# Patient Record
Sex: FEMALE | Race: WHITE | NOT HISPANIC OR LATINO | Employment: UNEMPLOYED | URBAN - METROPOLITAN AREA
[De-identification: names, ages, dates, MRNs, and addresses within clinical notes are randomized per-mention and may not be internally consistent; named-entity substitution may affect disease eponyms.]

---

## 2018-01-03 ENCOUNTER — OFFICE VISIT (OUTPATIENT)
Dept: URGENT CARE | Facility: RETAIL CLINIC | Age: 53
End: 2018-01-03
Payer: COMMERCIAL

## 2018-01-03 VITALS — TEMPERATURE: 97.8 F | DIASTOLIC BLOOD PRESSURE: 83 MMHG | SYSTOLIC BLOOD PRESSURE: 134 MMHG | HEART RATE: 69 BPM

## 2018-01-03 DIAGNOSIS — R09.81 NASAL SINUS CONGESTION: ICD-10-CM

## 2018-01-03 DIAGNOSIS — J01.90 ACUTE SINUSITIS WITH SYMPTOMS > 10 DAYS: Primary | ICD-10-CM

## 2018-01-03 PROCEDURE — 99213 OFFICE O/P EST LOW 20 MIN: CPT | Performed by: PHYSICIAN ASSISTANT

## 2018-01-03 NOTE — MR AVS SNAPSHOT
"              After Visit Summary   1/3/2018    Iza Aviles    MRN: [id number]           Patient Information     Date Of Birth          1965        Visit Information        Provider Department      1/3/2018 3:30 PM Tash Pa PA-C [facility]        Today's Diagnoses     Nasal sinus congestion    -  1    Acute sinusitis with symptoms > 10 days          Care Instructions    Mucinex in am.    Saline nose spray.      Please FOLLOW UP at primary care clinic if not improving, new symptoms, worse or this does not resolve.            Follow-ups after your visit        Who to contact     You can reach your care team any time of the day by calling [phone].  Notification of test results:  If you have an abnormal lab result, we will notify you by phone as soon as possible.         Additional Information About Your Visit        MyChart Information     Spart[facility] [employer] lets you send messages to your doctor, view your test results, renew your prescriptions, schedule appointments and more. To sign up, go to [email or web address] . Click on \"Log in\" on the left side of the screen, which will take you to the Welcome page. Then click on \"Sign up Now\" on the right side of the page.     You will be asked to enter the access code listed below, as well as some personal information. Please follow the directions to create your username and password.     Your access code is: [email or web address]  Expires: 4/3/2018  4:07 PM     Your access code will  in 90 days. If you need help or a new code, please call your [place] clinic or [phone].        Care EveryWhere ID     This is your Care EveryWhere ID. This could be used by other organizations to access your [place] medical records  [id number]        Your Vitals Were     Pulse Temperature                69 97.8  F (36.6  C) (Oral)           Blood Pressure from Last 3 Encounters:   18 134/83   16 135/84   06/11/15 116/70    Weight from Last 3 " Encounters:   06/03/13 155 lb (70.3 kg)   05/13/04 160 lb (72.6 kg)              Today, you had the following     No orders found for display         Today's Medication Changes          These changes are accurate as of: 1/3/18  4:07 PM.  If you have any questions, ask your nurse or doctor.               Start taking these medicines.        Dose/Directions    amoxicillin-clavulanate 875-125 MG per tablet   Commonly known as:  AUGMENTIN   Used for:  Acute sinusitis with symptoms > 10 days   Started by:  Tash Pa PA-C        Dose:  1 tablet   Take 1 tablet by mouth 2 times daily   Quantity:  28 tablet   Refills:  0            Where to get your medicines      These medications were sent to [address]  [address] [facility] [address]     Phone:  [phone]     amoxicillin-clavulanate 875-125 MG per tablet                Primary Care Provider Office Phone # Fax #    [facility] [phone]       [address] [facility] [address]        Equal Access to Services     [facility]: Hadii aad ku hadasho Soomaali, waaxda luqadaha, qaybta kaalmada adeegyada, waxay idiin hayantonio hernandez . So [facility] [phone].    ATENCIÓN: Si habla español, tiene a dotson disposición servicios gratuitos de asistencia lingüística. Llame al [phone].    We comply with applicable federal civil rights laws and [place] laws. We do not discriminate on the basis of race, color, national origin, age, disability, sex, sexual orientation, or gender identity.            Thank you!     Thank you for choosing [facility]  for your care. Our goal is always to provide you with excellent care. Hearing back from our patients is one way we can continue to improve our services. Please take a few minutes to complete the written survey that you may receive in the mail after your visit with us. Thank you!             Your Updated Medication List -  Protect others around you: Learn how to safely use, store and throw away your medicines at www.disposemymeds.org.          This list is accurate as of: 1/3/18  4:07 PM.  Always use your most recent med list.                   Brand Name Dispense Instructions for use Diagnosis    amoxicillin-clavulanate 875-125 MG per tablet    AUGMENTIN    28 tablet    Take 1 tablet by mouth 2 times daily    Acute sinusitis with symptoms > 10 days       BENADRYL PO           IBUPROFEN PO      Take 600 mg by mouth once.        SINUS & ALLERGY PO

## 2018-01-03 NOTE — PROGRESS NOTES
Chief Complaint   Patient presents with     Sinus Problem     for about 3-4 days         SUBJECTIVE:   Pt. presenting to [facility] -  with a chief complaint of nasal and sinus congestion with > HA and pain and 'teeth hurt'. Had a head cold x weeks.   See CC..  Onset of symptoms gradual  Course of illness is worsening.    Severity moderate  Current and Associated symptoms: runny nose, stuffy nose, facial pain/pressure, headache and fatigue  Treatment measures tried include Tylenol/Ibuprofen and OTC Cough med.  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  Last antibiotic 12/2016 Augmentin for sinusitis   Smoker no    ROS:  Afebrile   Energy level is <  ENT - denies ear pain, throat pain.    CP - no cough,SOB or chest pain   GI- - appetite normal. No nausea, vomiting or diarrhea.   No bowel or bladder changes   MSK - no joint pain or swelling   Skin: No rashes  No past medical history on file.  No past surgical history on file.  There is no problem list on file for this patient.    Current Outpatient Prescriptions   Medication     DiphenhydrAMINE HCl (BENADRYL PO)     Chlorpheniramine-Phenylephrine (SINUS & ALLERGY PO)     IBUPROFEN PO     No current facility-administered medications for this visit.        OBJECTIVE:  /83  Pulse 69  Temp 97.8  F (36.6  C) (Oral)    GENERAL APPEARANCE: cooperative, alert and no distress. Appears well hydrated.  EYES: conjunctiva clear  HENT: Rt ear canal  clear and TM normal   Lt ear canal clear and TM normal   Nose mod congestion. thick discharge  Mouth without ulcers or lesions. no erythema. no exudate.  NECK: supple, few small shoddy NT ant nodes. No  posterior nodes.  RESP: lungs clear to auscultation - no rales, rhonchi or wheezes. Breathing easily.  CV: regular rates and rhythm  ABDOMEN:  soft, nontender, no HSM or masses and bowel sounds normal   SKIN: no suspicious lesions or rashes  some tenderness to palpate over martha max and left frontal  sinus areas.    ASSESSMENT:     Nasal sinus congestion  Acute sinusitis with symptoms > 10 days      PLAN:  Symptomatic measures Prescriptions as below. Discussed indications, dosing, side affects and adverse reactions of medications with  Patient- Augmentin  Eat yogurt daily or take a probiotic supplement when on antibiotics.  OTC cough suppressant/expectorant discussed  Salt water gargles - throat lozenges or honey/lemon tea if soothing and has a ST  saline nasal spray for  nasal congestion   Cool mist vaporizer.     Stay in clean air environment.  > rest.  > fluids.  Contagiousness and hygiene discussed.  Fever and pain  control measures discussed.   If unable to swallow or any breathing difficulty to go to ED AVS given and discussed:  Patient Instructions   Mucinex in am.    Saline nose spray.      Please FOLLOW UP at primary care clinic if not improving, new symptoms, worse or this does not resolve.    Pt is comfortable with this plan.  Electronically signed,  ABELARDO Pa PAC

## 2018-01-03 NOTE — PATIENT INSTRUCTIONS
Mucinex in am.    Saline nose spray.      Please FOLLOW UP at primary care clinic if not improving, new symptoms, worse or this does not resolve.

## 2019-11-27 ENCOUNTER — OFFICE VISIT (OUTPATIENT)
Dept: URGENT CARE | Facility: RETAIL CLINIC | Age: 54
End: 2019-11-27
Payer: COMMERCIAL

## 2019-11-27 VITALS
SYSTOLIC BLOOD PRESSURE: 130 MMHG | DIASTOLIC BLOOD PRESSURE: 84 MMHG | HEART RATE: 85 BPM | OXYGEN SATURATION: 97 % | TEMPERATURE: 98.3 F

## 2019-11-27 DIAGNOSIS — J20.9 ACUTE BRONCHITIS WITH SYMPTOMS > 10 DAYS: Primary | ICD-10-CM

## 2019-11-27 PROCEDURE — 99213 OFFICE O/P EST LOW 20 MIN: CPT | Performed by: INTERNAL MEDICINE

## 2019-11-27 RX ORDER — AZITHROMYCIN 250 MG/1
TABLET, FILM COATED ORAL
Qty: 6 TABLET | Refills: 0 | Status: SHIPPED | OUTPATIENT
Start: 2019-11-27

## 2019-11-27 NOTE — PROGRESS NOTES
[facility] Care Progress Note        Austyn Mustafa MD, MPH  11/27/2019        History:      Iza Aviles is a pleasant 54 year old year old female with a chief complaint of nasal congestion and cough w some whitish sputum for 5 weeks.No recent travel,no trauma and no recent surgery.No calf pain or swelling.  No fever or chills.   No dyspnea or wheezing. No chest pain.  No smoking history.   No headache or neck pain.  No GI or  symptoms.   No MSK symptoms.         Assessment and Plan:          Acute bronchitis with symptoms > 10 days  - azithromycin (ZITHROMAX) 250 MG tablet; Two tablets first day, then one tablet daily for four days.  Dispense: 6 tablet; Refill: 0  Discussed supportive care with the patient  Advised to increase fluid intake and rest.  F/u w PCP in 4-5 days, earlier if symptoms worsen.                   Physical Exam:      /84   Pulse 85   Temp 98.3  F (36.8  C) (Oral)   SpO2 97%      Constitutional: Patient is in no distress The patient is pleasant and cooperative.   HEENT: Head:  Head is atraumatic, normocephalic.    Eyes: Pupils are equal, round and reactive to light and accomodation.  Sclera is non-icteric. No conjunctival injection, or exudate noted. Extraocular motion is intact. Visual acuity is intact bilaterally.  Ears:  External acoustic canals are patent and clear.  There is no erythema and bulging( exudate)  of the ( R/L ) tympanic membrane(s ).   Nose:  Nasal congestion w/o drainage or mucosal ulceration is noted.  Throat: Oral mucosa is moist. No oral lesions are noted. Posterior pharynx is clear.     Neck Supple. There is no cervical lymphadenopathy.  No nuchal rigidity noted.  There is no meningismus.     Cardiovascular: Heart is regular to rate and rhythm.  No murmur is noted.     Lungs: Clear in the anterior and posterior pulmonary fields.   Abdomen: Soft and non-tender.    Back No flank tenderness is noted.   Extremeties No edema, no calf tenderness.    Neuro: No focal deficit.   Skin No petechiae or purpura is noted.  There is no rash.   Mood Normal              Data:      All new lab and imaging data was reviewed.   No results found for any visits on 11/27/19.

## 2022-11-04 ENCOUNTER — NURSE TRIAGE (OUTPATIENT)
Dept: NURSING | Facility: CLINIC | Age: 57
End: 2022-11-04

## 2022-11-04 NOTE — TELEPHONE ENCOUNTER
"Pt reports \"today at work got a paper cut on eyeball, a little bit blurred vision\". Pt reports if she closes her uninjured eye the injured eye is \"super blurry\". See full assessment below.    Advised pt to have another adult drive her to the ER now per protocol.    Pt verbalizes understanding and agrees to plan.     Reason for Disposition    Vision is blurred or lost in either eye    Additional Information    Negative: Knocked out (unconscious) > 1 minute    Negative: Sounds like a life-threatening emergency to the triager    Negative: Wound looks infected    Negative: Foreign body in the eye    Negative: Head injury is main concern    Negative: Neck injury is main concern    Answer Assessment - Initial Assessment Questions  1. MECHANISM: \"How did the injury happen?\"       Paper cut L eye \"me with a piece of paper\"  2. ONSET: \"When did the injury happen?\" (Minutes or hours ago)      8:30 am   3. LOCATION: \"What part of the eye is injured?\" (cornea, sclera, eyelid, or periorbital tissue)      \"felt like eyeball right in the center\"   4. APPEARANCE: \"What does the eye look like?\"      \"three other people saw a line, me personally can't really tell just looking at it\"  5. VISION: \"Is the vision blurred?\"       \"if I close one eye it's super blurry, if I close the eye that's good\"  6. PAIN: \"Is it painful?\" If Yes, ask: \"How bad is the pain?\"   (e.g., Scale 1-10; or mild, moderate, severe)     \"like hit in the eye with something, couldn't open for ten minutes, now not really painful\"  7. SIZE: For cuts, bruises, or swelling, ask: \"How large is it?\" (e.g., inches or centimeters)       Pt unable to tell   8. TETANUS: For any breaks in the skin, ask: \"When was the last tetanus booster?\"      See chart   9. CONTACTS: \"Do you wear contacts?\"      Pt denies   10. OTHER SYMPTOMS: \"Do you have any other symptoms?\" (e.g., headache, neck pain, vomiting)        Pt denies  11. PREGNANCY: \"Is there any chance you are pregnant?\" " "\"When was your last menstrual period?\"        n/a    Protocols used: EYE INJURY-A-[place]      "

## 2023-12-16 ENCOUNTER — NURSE TRIAGE (OUTPATIENT)
Dept: NURSING | Facility: CLINIC | Age: 58
End: 2023-12-16
Payer: COMMERCIAL

## 2023-12-16 ENCOUNTER — APPOINTMENT (OUTPATIENT)
Dept: CT IMAGING | Facility: CLINIC | Age: 58
End: 2023-12-16
Attending: EMERGENCY MEDICINE
Payer: COMMERCIAL

## 2023-12-16 ENCOUNTER — HOSPITAL ENCOUNTER (EMERGENCY)
Facility: CLINIC | Age: 58
Discharge: HOME | End: 2023-12-16
Attending: EMERGENCY MEDICINE | Admitting: EMERGENCY MEDICINE
Payer: COMMERCIAL

## 2023-12-16 ENCOUNTER — APPOINTMENT (OUTPATIENT)
Dept: GENERAL RADIOLOGY | Facility: CLINIC | Age: 58
End: 2023-12-16
Attending: EMERGENCY MEDICINE
Payer: COMMERCIAL

## 2023-12-16 VITALS
TEMPERATURE: 98.6 F | HEART RATE: 91 BPM | SYSTOLIC BLOOD PRESSURE: 144 MMHG | RESPIRATION RATE: 16 BRPM | OXYGEN SATURATION: 97 % | DIASTOLIC BLOOD PRESSURE: 87 MMHG | HEIGHT: 64 IN

## 2023-12-16 DIAGNOSIS — S02.5XXA CLOSED FRACTURE OF TOOTH, INITIAL ENCOUNTER: ICD-10-CM

## 2023-12-16 PROCEDURE — 99284 EMERGENCY DEPT VISIT MOD MDM: CPT | Mod: 25 | Performed by: EMERGENCY MEDICINE

## 2023-12-16 PROCEDURE — 250N000013 HC RX MED GY IP 250 OP 250 PS 637: Performed by: EMERGENCY MEDICINE

## 2023-12-16 PROCEDURE — 70486 CT MAXILLOFACIAL W/O DYE: CPT

## 2023-12-16 PROCEDURE — 72125 CT NECK SPINE W/O DYE: CPT

## 2023-12-16 PROCEDURE — 99284 EMERGENCY DEPT VISIT MOD MDM: CPT | Performed by: EMERGENCY MEDICINE

## 2023-12-16 PROCEDURE — 71046 X-RAY EXAM CHEST 2 VIEWS: CPT

## 2023-12-16 PROCEDURE — 71046 X-RAY EXAM CHEST 2 VIEWS: CPT | Mod: 26 | Performed by: RADIOLOGY

## 2023-12-16 PROCEDURE — 72125 CT NECK SPINE W/O DYE: CPT | Mod: 26 | Performed by: RADIOLOGY

## 2023-12-16 PROCEDURE — 70450 CT HEAD/BRAIN W/O DYE: CPT

## 2023-12-16 PROCEDURE — 70450 CT HEAD/BRAIN W/O DYE: CPT | Mod: 26 | Performed by: RADIOLOGY

## 2023-12-16 PROCEDURE — 70486 CT MAXILLOFACIAL W/O DYE: CPT | Mod: 26 | Performed by: RADIOLOGY

## 2023-12-16 RX ORDER — ACETAMINOPHEN 500 MG
1000 TABLET ORAL 3 TIMES DAILY
Qty: 42 TABLET | Refills: 0 | Status: SHIPPED | OUTPATIENT
Start: 2023-12-16 | End: 2023-12-23

## 2023-12-16 RX ORDER — ACETAMINOPHEN 500 MG
1000 TABLET ORAL ONCE
Status: COMPLETED | OUTPATIENT
Start: 2023-12-16 | End: 2023-12-16

## 2023-12-16 RX ORDER — IBUPROFEN 200 MG
400 TABLET ORAL ONCE
Status: COMPLETED | OUTPATIENT
Start: 2023-12-16 | End: 2023-12-16

## 2023-12-16 RX ORDER — IBUPROFEN 200 MG
400 TABLET ORAL 3 TIMES DAILY
Qty: 42 TABLET | Refills: 0 | Status: SHIPPED | OUTPATIENT
Start: 2023-12-16

## 2023-12-16 RX ADMIN — IBUPROFEN 400 MG: 200 TABLET, FILM COATED ORAL at 15:38

## 2023-12-16 RX ADMIN — ACETAMINOPHEN 1000 MG: 500 TABLET ORAL at 15:39

## 2023-12-16 ASSESSMENT — ACTIVITIES OF DAILY LIVING (ADL)
ADLS_ACUITY_SCORE: 33
ADLS_ACUITY_SCORE: 35

## 2023-12-16 NOTE — TELEPHONE ENCOUNTER
Nurse Triage SBAR    Is this a 2nd Level Triage? No    Situation/Background: Patient is calling with concern of being pushed by a horse in a gate area. The patient was pushed against the gate, a tooth on upper jaw is out of place. The gum is rough and damaged. Patient washed blood off her face due to tooth and nose being scraped.   Patient states the breath was knocked out of her but she did not lose consciousness.    Assessment:   Denies pain in upper jaw  Tooth on upper right jaw is still in jaw but appears to be knocked lose.     Recommendation: Per disposition, Go to ED now. Reviewed Care Advice with patient and verbalizes understanding. Patient was also given the number of emergency dental resident on call: [phone]. Declines additional questions. Advised patient to call back with any new or worsening symptoms. Patient verbalized understanding and agrees with plan. Assisted with connecting with scheduling for an appointment. If no visits available, can be seen in /[facility].     Protocol Recommended Disposition: Emergency department    Kaye Edwards RN on 12/16/2023 at 11:30 AM  [facility]  Reason for Disposition   Tooth is almost falling out    Additional Information   Negative: Knocked out (unconscious) > 1 minute   Negative: Difficult to awaken or acting confused (e.g., disoriented, slurred speech)   Negative: Severe neck pain   Negative: Sounds like a life-threatening emergency to the triager   Negative: Head injury is main concern   Negative: Neck injury is main concern   Negative: Wound looks infected   Negative: Tooth knocked out    Protocols used: Tooth Injury-A-

## 2023-12-16 NOTE — ED TRIAGE NOTES
Triage Assessment (Adult)       Row Name 12/16/23 1410          Triage Assessment    Airway WDL WDL        Respiratory WDL    Respiratory WDL WDL        Skin Circulation/Temperature WDL    Skin Circulation/Temperature WDL WDL        Cardiac WDL    Cardiac WDL WDL        Peripheral/Neurovascular WDL    Peripheral Neurovascular WDL WDL        Cognitive/Neuro/Behavioral WDL    Cognitive/Neuro/Behavioral WDL WDL

## 2023-12-16 NOTE — ED PROVIDER NOTES
"    [place] EMERGENCY DEPARTMENT ([facility])    12/16/23       ED PROVIDER NOTE       History     Chief Complaint   Patient presents with    Dental Injury     HPI  Iza Aviles is a 58 year old female who presents to the emergency department with complaints of dental pain after being pushed against a gate from horses with a 1 on the right front teeth out of place.  They referred by dentist to come to the ED and be seen by dental.  Patient reports while she was pinned by the horse she felt the wind was knocked out of her and she has some residual right-sided chest discomfort and right shoulder discomfort.  She denies any head trauma no loss of consciousness.  She denies any other complaints.    Past Medical History  History reviewed. No pertinent past medical history.  History reviewed. No pertinent surgical history.  azithromycin (ZITHROMAX) 250 MG tablet  Chlorpheniramine-Phenylephrine (SINUS & ALLERGY PO)  DiphenhydrAMINE HCl (BENADRYL PO)  IBUPROFEN PO      Allergies   Allergen Reactions    No Known Allergies      Family History  History reviewed. No pertinent family history.  Social History   Social History     Tobacco Use    Smoking status: Never    Smokeless tobacco: Never         A complete review of systems was performed with pertinent positives and negatives noted in the HPI, and all other systems negative.    Physical Exam   BP: (!) 144/87  Pulse: 91  Temp: 98.6  F (37  C)  Resp: 16  Height: 162.6 cm (5' 4\")  SpO2: 97 %  Physical Exam  Vitals and nursing note reviewed.   Constitutional:       General: She is not in acute distress.     Appearance: Normal appearance. She is well-developed.   HENT:      Head: Normocephalic and atraumatic.   Eyes:      General: No scleral icterus.     Conjunctiva/sclera: Conjunctivae normal.   Cardiovascular:      Rate and Rhythm: Normal rate.   Pulmonary:      Effort: Pulmonary effort is normal. No respiratory distress.   Abdominal:      General: Abdomen is flat. "   Musculoskeletal:         General: No tenderness.      Cervical back: Normal range of motion and neck supple.   Skin:     General: Skin is warm and dry.      Findings: No rash.   Neurological:      Mental Status: She is alert and oriented to person, place, and time.             ED Course, Procedures, & Data      Procedures                 No results found for any visits on 12/16/23.  Medications   acetaminophen (TYLENOL) tablet 1,000 mg (has no administration in time range)   ibuprofen (ADVIL/MOTRIN) tablet 400 mg (has no administration in time range)     Labs Ordered and Resulted from Time of ED Arrival to Time of ED Departure - No data to display  CT Facial Bones without Contrast    (Results Pending)   Head CT w/o contrast    (Results Pending)   CT Cervical Spine w/o Contrast    (Results Pending)   XR Chest 2 Views    (Results Pending)          Critical care was not performed.     Medical Decision Making  The patient's presentation was of moderate complexity (an acute complicated injury).    The patient's evaluation involved:  ordering and/or review of 3+ test(s) in this encounter (see separate area of note for details)  discussion of management or test interpretation with another health professional (dental consult)    The patient's management necessitated moderate risk (prescription drug management including medications given in the ED).    Assessment & Plan      58 year old female who presents to the emergency department with complaints of dental pain after being pushed against a gate from horses with a 1 on the right front teeth out of place.  Vital signs notable for blood pressure was 144/70 otherwise afebrile and stable including normal pulse ox at 97% on room air.  Patient is given acetaminophen and ibuprofen with out-of-control pain.  Patient sent to CT for imaging of the facial bones and head along with cervical spine and chest x-ray.  The chest x-ray was interpreted independently by me and revealed no  acute process.  CT facial bones revealed evidence of dental fracture and poor dentition.  Dental resident was called and evaluated patient at the bedside, please refer to their note for further details.  Plan will be to follow-up with patient's primary dentist on Monday for further evaluation and care.    I have reviewed the nursing notes. I have reviewed the findings, diagnosis, plan and need for follow up with the patient.    New Prescriptions    No medications on file       Final diagnoses:   Closed fracture of tooth, initial encounter       Kevin Perez, am serving as a trained medical scribe to document services personally performed by Last Manning MD based on the provider's statements to me on December 16, 2023.  This document has been checked and approved by the attending provider.    I, Last Manning MD, was physically present and have reviewed and verified the accuracy of this note documented by Kevin Hernández medical scribe.      Last Manning MD   [facility] EMERGENCY DEPARTMENT  12/16/2023     Last Manning MD  12/16/23 9032

## 2023-12-16 NOTE — ED TRIAGE NOTES
Pt was pushed against the gait from horses and one of the R front teeth is out of place. Referred by dentist to come to ER to be seen by dental.
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
## 2023-12-27 ENCOUNTER — TELEPHONE (OUTPATIENT)
Dept: FAMILY MEDICINE | Facility: CLINIC | Age: 58
End: 2023-12-27
Payer: COMMERCIAL

## 2023-12-27 NOTE — TELEPHONE ENCOUNTER
Pt is needing to get an annual visit before the end of the year for insurance. Asking if ok to use 40 min  1020/1040 this Friday for patient with Dr. Vivar. Call pt either way.

## 2025-05-17 NOTE — NURSING NOTE
"Chief Complaint   Patient presents with     Sinus Problem     for about 3-4 days       Initial /83  Pulse 69  Temp 97.8  F (36.6  C) (Oral) Estimated body mass index is 26.61 kg/(m^2) as calculated from the following:    Height as of 6/3/13: 5' 4\" (1.626 m).    Weight as of 6/3/13: 155 lb (70.3 kg).  Medication Reconciliation: complete   Deepthi Hart      "
While in Labor & Delivery